# Patient Record
Sex: FEMALE | Race: AMERICAN INDIAN OR ALASKA NATIVE
[De-identification: names, ages, dates, MRNs, and addresses within clinical notes are randomized per-mention and may not be internally consistent; named-entity substitution may affect disease eponyms.]

---

## 2017-01-30 NOTE — CAT SCAN REPORT
CT ABDOMEN AND PELVIS WITHOUT AND WITH CONTRAST: 01/30/17 08:59:00



CLINICAL: Microscopic hematuria.



COMPARISON: None.





TECHNIQUE:

CT urogram technique with triple bolus injection of contrast.  The 

triple bolus protocol consisted of 30 cc of contrast material  at 2 cc 

per second at 0 seconds, 50 cc at 1.5 cc per second at 435 seconds and 

65 cc at 3 cc per second at 488 seconds with a total abdominal scanning 

of the 510 seconds.  Volumetric acquisition and 1.25 millimeter scan 

reconstructions without contrast and after the uneventful intravenous 

injection of contrast.  Consent was obtained prior to the 

administration of contrast.  Oral contrast was not given. 



FINDINGS:





Abdomen: The kidneys are normal size and contours.  The right kidney 

measures 11.2 cm in length and the left kidney measures 9.9 cm in 

length.  No urinary calculus, mass or cyst.  Normal nondilated renal 

collecting systems and ureters.  Normal liver, gallbladder and bile 

ducts.   Normal stomach, duodenum, pancreas and spleen.  Normal adrenal 

glands.  Normal aorta and IVC.  Normal small bowel. Normal colon. 

Normal appendix.



Pelvis: The urinary bladder was moderately distended at the beginning 

of the examination.  No urinary bladder calculus or mass.  Normal 

uterus, rectum and sigmoid colon.

Bone windows demonstrate no suspicious bone lesion.



IMPRESSION:Normal study except for moderate distention of the urinary 

bladder which raises the possibility of cystitis or neurogenic bladder.

## 2017-12-29 NOTE — MAMMOGRAPHY REPORT
BILATERAL DIGITAL AUGMENTED SCREENING MAMMOGRAM with CAD: 12/29/17 

10:57:00



CLINICAL: Routine screening.



COMPARISON:12/12/16



FINDINGS: Screening views with and without implant displacement 

demonstrate mostly fatty breasts. No mass, architectural distortion or 

suspicious calcifications.  Intact subpectoral implants.



IMPRESSION: No mammographic evidence of malignancy.



BI-RADS CATEGORY:  2 -- Benign



RECOMMENDATION: Routine mammographic screening in one year.



ACR BI-RADS MAMMOGRAPHIC CODES:

0 = Needs additional imaging evaluation; 1 = Negative; 2 = Benign; 3 = 

Probably benign; 4 = Suspicious; 5 = Malignant; 6 = Known biopsy-proven 

malignancy



COMMENT:

      1.   Dense breast tissue, i.e., adenosis, fibrocystic 

            changes, etc., may obscure an underlying neoplasm.

      2.   Approximately 10% of cancers are not detected with

            mammography.

      3.   A negative mammography report should not delay biopsy 

            if a clinically suspicious mass is present.



COMMENT:

Patient follow-up letters are generated via our [employer] application.

## 2019-04-15 ENCOUNTER — HOSPITAL ENCOUNTER (OUTPATIENT)
Dept: HOSPITAL 5 - SPVWC | Age: 59
Discharge: HOME | End: 2019-04-15
Attending: INTERNAL MEDICINE
Payer: COMMERCIAL

## 2019-04-15 DIAGNOSIS — Z12.31: Primary | ICD-10-CM

## 2019-04-15 PROCEDURE — 77067 SCR MAMMO BI INCL CAD: CPT

## 2019-04-15 NOTE — MAMMOGRAPHY REPORT
BILATERAL DIGITAL AUGMENTED SCREENING MAMMOGRAM with CAD: 04/15/19 

09:33:00



CLINICAL: Routine screening.



COMPARISON:12/29/17



FINDINGS: Screening views with and without implant displacement 

demonstrate mostly fatty breasts.  A few benign right calcifications. 

No mass, architectural distortion or suspicious calcifications.  Intact 

subpectoral implants.



IMPRESSION: No mammographic evidence of malignancy.



BI-RADS CATEGORY:  2 -- Benign



RECOMMENDATION: Routine mammographic screening in one year.



ACR BI-RADS MAMMOGRAPHIC CODES:

0 = Needs additional imaging evaluation; 1 = Negative; 2 = Benign; 3 = 

Probably benign; 4 = Suspicious; 5 = Malignant; 6 = Known biopsy-proven 

malignancy



COMMENT:

      1.   Dense breast tissue, i.e., adenosis, fibrocystic 

            changes, etc., may obscure an underlying neoplasm.

      2.   Approximately 10% of cancers are not detected with

            mammography.

      3.   A negative mammography report should not delay biopsy 

            if a clinically suspicious mass is present.



COMMENT:

Patient follow-up letters are generated via our [employer] application.

## 2020-11-17 ENCOUNTER — HOSPITAL ENCOUNTER (OUTPATIENT)
Dept: HOSPITAL 5 - SPVWC | Age: 60
Discharge: HOME | End: 2020-11-17
Attending: INTERNAL MEDICINE
Payer: COMMERCIAL

## 2020-11-17 DIAGNOSIS — K30: Primary | ICD-10-CM

## 2020-11-17 PROCEDURE — 76700 US EXAM ABDOM COMPLETE: CPT

## 2020-11-17 NOTE — ULTRASOUND REPORT
ULTRASOUND ABDOMEN, COMPLETE



INDICATION:

FUNCTIONAL DYSPESIA/ EPIGASTRIC PAIN.



COMPARISON:

CT scan dated 1/30/2017. 



FINDINGS:

Pancreas: Not well visualized due to overlying bowel gas. The imaged portions show no acute abnormali
ty..

Abdominal Aorta: No significant abnormality. IVC: No significant abnormality.

Liver: No significant abnormality. 

Gallbladder: No significant abnormality. 

Bile ducts: No significant abnormality. Common bile duct measures 4 mm. 

Kidneys: Right: No significant abnormality. Left : No significant abnormality.

Spleen: No significant abnormality.



Free fluid: None.

Additional Findings: None.



IMPRESSION:

1. No acute sonographic abnormality of the abdomen. The pancreas is not well visualized due to overly
ing bowel gas.



Signer Name: Chi Menon MD 

Signed: 11/17/2020 11:05 AM

Workstation Name: [id number]

## 2020-12-21 ENCOUNTER — HOSPITAL ENCOUNTER (OUTPATIENT)
Dept: HOSPITAL 5 - SPVWC | Age: 60
Discharge: HOME | End: 2020-12-21
Attending: INTERNAL MEDICINE
Payer: COMMERCIAL

## 2020-12-21 DIAGNOSIS — Z00.00: ICD-10-CM

## 2020-12-21 DIAGNOSIS — Z78.0: ICD-10-CM

## 2020-12-21 DIAGNOSIS — Z13.820: Primary | ICD-10-CM

## 2020-12-21 PROCEDURE — 77080 DXA BONE DENSITY AXIAL: CPT

## 2020-12-23 NOTE — MAMMOGRAPHY REPORT
DEXA BONE DENSITY SCAN



INDICATION / CLINICAL INFORMATION:

OSTEOPOROSIS SCREENING.

60 years Female



COMPARISON: 

11/8/2013



LUMBAR SPINE (L1-L4):

- Bone mineral density (BMD) = 1.257 g/cm2.

- T-score = 1.9 

- Z-score = 3.2 



Change (%) since most recent prior (if available):  2.1% increase



FEMORAL NECKS:

- Left neck Bone mineral density (BMD) = 0.899 g/cm2.

- T-score = 0.5 

- Z-score = 1.7 



Change (%) since most recent prior (if available):  6.7% decrease







IMPRESSION:

1. WHO Classification: Normal bone density. Fracture Risk: Not Increased.







=================================================================

BMD Reporting Guidelines (ISCD, 2015)

=================================================================

BMD Reporting in Postmenopausal Women and in Men Age 50 and Older

*  T-scores are preferred.

*  The WHO densitometric classification is applicable.

BMD Reporting in Females Prior to Menopause and in Males Younger Than Age 50

*  Z-scores, not T-scores, are preferred. This is particularly important in children.

*  A Z-score of -2.0 or lower is defined as below the expected range for age, and a Z-score above -2.
0 is within the expected range for age.

*  Osteoporosis cannot be diagnosed in men under age 50 on the basis of BMD alone.

*  The WHO diagnostic criteria may be applied to women in the menopausal transition.





http://www.iscd.org/official-positions/5554-xdbs-pzgfqgzf-positions-adult/





Signer Name: Sonny Merino MD 

Signed: 12/23/2020 12:55 PM

Workstation Name: [email or web address]

## 2021-10-11 ENCOUNTER — HOSPITAL ENCOUNTER (OUTPATIENT)
Dept: HOSPITAL 5 - SPVWC | Age: 61
Discharge: HOME | End: 2021-10-11
Attending: INTERNAL MEDICINE
Payer: COMMERCIAL

## 2021-10-11 DIAGNOSIS — Z12.31: Primary | ICD-10-CM

## 2021-10-11 PROCEDURE — 77067 SCR MAMMO BI INCL CAD: CPT

## 2021-10-11 PROCEDURE — 77063 BREAST TOMOSYNTHESIS BI: CPT

## 2021-10-12 NOTE — MAMMOGRAPHY REPORT
DIGITAL SCREENING MAMMOGRAM WITH TOMOSYNTHESIS WITH CAD, 10/11/2021



CLINICAL INFORMATION / INDICATION: Routine Screening Mammography. 



TECHNIQUE:  Digital bilateral 2D and 3D mammography with tomosynthesis was obtained in the craniocaud
al and mediolateral oblique projections.  Computer-Aided Detection (CAD) analysis was used for interp
retation of this study.



COMPARISON: 7/14/2020, 12/12/2016



FINDINGS: 



Breast Density: There are scattered areas of fibroglandular density.



No dominant mass, suspicious calcifications, or architectural distortion in either breast. 



Bilateral silicone retropectoral breast implants are present.

 

IMPRESSION: No mammographic evidence of malignancy.



Follow up recommendation: Routine yearly



BI-RADS Category 2:  Benign.





-------------------------------------------------------------------------------------------

A "normal" or negative report should not discourage follow up or biopsy of a clinically significant f
inding.



A written summary of these findings will be mailed to the patient. The patient will be entered into a
 mammography reporting system which will generate a reminder letter for the patient's next appointmen
t at the appropriate interval.



The American College [employer] recommends yearly mammograms starting at age 40 and continuing as l
skip as a woman is in good health.  Breast MRI is recommended for women with an approximate 20-25% or 
greater lifetime risk of breast cancer, including women with a strong family history of breast or ova
tony cancer or who have been treated for Hodgkin's disease.



Signer Name: Keri Davila MD 

Signed: 10/12/2021 8:24 AM

Workstation Name: [email or web address]

## 2021-10-12 NOTE — MAMMOGRAPHY REPORT
DIGITAL SCREENING MAMMOGRAM WITH TOMOSYNTHESIS WITH CAD, 10/11/2021



CLINICAL INFORMATION / INDICATION: Routine Screening Mammography. 



TECHNIQUE:  Digital bilateral 2D and 3D mammography with tomosynthesis was obtained in the craniocaud
al and mediolateral oblique projections.  Computer-Aided Detection (CAD) analysis was used for interp
retation of this study.



COMPARISON: 7/14/2020, 12/12/2016



FINDINGS: 



Breast Density: There are scattered areas of fibroglandular density.



No dominant mass, suspicious calcifications, or architectural distortion in either breast. 



Bilateral silicone retropectoral breast implants are present.

 

IMPRESSION: No mammographic evidence of malignancy.



Follow up recommendation: Routine yearly



BI-RADS Category 2:  Benign.





-------------------------------------------------------------------------------------------

A "normal" or negative report should not discourage follow up or biopsy of a clinically significant f
inding.



A written summary of these findings will be mailed to the patient. The patient will be entered into a
 mammography reporting system which will generate a reminder letter for the patient's next appointmen
t at the appropriate interval.



The American College [employer] recommends yearly mammograms starting at age 40 and continuing as l
skip as a woman is in good health.  Breast MRI is recommended for women with an approximate 20-25% or 
greater lifetime risk of breast cancer, including women with a strong family history of breast or ova
tony cancer or who have been treated for Hodgkin's disease.



Signer Name: Keri Davila MD 

Signed: 10/12/2021 8:24 AM

Workstation Name: [email or web address]